# Patient Record
Sex: MALE | Race: WHITE | HISPANIC OR LATINO | URBAN - METROPOLITAN AREA
[De-identification: names, ages, dates, MRNs, and addresses within clinical notes are randomized per-mention and may not be internally consistent; named-entity substitution may affect disease eponyms.]

---

## 2017-05-23 ENCOUNTER — EMERGENCY (EMERGENCY)
Facility: HOSPITAL | Age: 32
LOS: 1 days | Discharge: HOME | End: 2017-05-23
Admitting: EMERGENCY MEDICINE
Payer: MEDICAID

## 2017-05-23 PROCEDURE — 99283 EMERGENCY DEPT VISIT LOW MDM: CPT | Mod: 25

## 2017-05-23 PROCEDURE — 99283 EMERGENCY DEPT VISIT LOW MDM: CPT

## 2017-12-15 ENCOUNTER — EMERGENCY (EMERGENCY)
Facility: HOSPITAL | Age: 32
LOS: 1 days | Discharge: HOME | End: 2017-12-15
Admitting: EMERGENCY MEDICINE
Payer: MEDICAID

## 2017-12-15 DIAGNOSIS — H57.13 OCULAR PAIN, BILATERAL: ICD-10-CM

## 2017-12-15 DIAGNOSIS — Z23 ENCOUNTER FOR IMMUNIZATION: ICD-10-CM

## 2017-12-15 DIAGNOSIS — T15.11XA FOREIGN BODY IN CONJUNCTIVAL SAC, RIGHT EYE, INITIAL ENCOUNTER: ICD-10-CM

## 2017-12-15 DIAGNOSIS — T15.12XA FOREIGN BODY IN CONJUNCTIVAL SAC, LEFT EYE, INITIAL ENCOUNTER: ICD-10-CM

## 2017-12-15 PROCEDURE — 99283 EMERGENCY DEPT VISIT LOW MDM: CPT | Mod: 25

## 2017-12-15 PROCEDURE — 65205 REMOVE FOREIGN BODY FROM EYE: CPT | Mod: 50

## 2017-12-15 PROCEDURE — 90471 IMMUNIZATION ADMIN: CPT

## 2017-12-15 PROCEDURE — 65205 REMOVE FOREIGN BODY FROM EYE: CPT | Mod: E1,E3

## 2018-01-02 ENCOUNTER — APPOINTMENT (OUTPATIENT)
Dept: FAMILY MEDICINE | Facility: HOSPITAL | Age: 33
End: 2018-01-02
Payer: MEDICAID

## 2018-01-02 ENCOUNTER — OUTPATIENT (OUTPATIENT)
Dept: OUTPATIENT SERVICES | Facility: HOSPITAL | Age: 33
LOS: 1 days | End: 2018-01-02
Payer: COMMERCIAL

## 2018-01-02 ENCOUNTER — FORM ENCOUNTER (OUTPATIENT)
Age: 33
End: 2018-01-02

## 2018-01-02 VITALS
TEMPERATURE: 97.8 F | WEIGHT: 183 LBS | BODY MASS INDEX: 30.49 KG/M2 | RESPIRATION RATE: 14 BRPM | HEART RATE: 65 BPM | SYSTOLIC BLOOD PRESSURE: 120 MMHG | DIASTOLIC BLOOD PRESSURE: 77 MMHG | HEIGHT: 65 IN | OXYGEN SATURATION: 98 %

## 2018-01-02 DIAGNOSIS — B20 HUMAN IMMUNODEFICIENCY VIRUS [HIV] DISEASE: ICD-10-CM

## 2018-01-02 DIAGNOSIS — Z86.39 PERSONAL HISTORY OF OTHER ENDOCRINE, NUTRITIONAL AND METABOLIC DISEASE: ICD-10-CM

## 2018-01-02 DIAGNOSIS — Z78.9 OTHER SPECIFIED HEALTH STATUS: ICD-10-CM

## 2018-01-02 DIAGNOSIS — R10.11 RIGHT UPPER QUADRANT PAIN: ICD-10-CM

## 2018-01-02 DIAGNOSIS — Z00.00 ENCOUNTER FOR GENERAL ADULT MEDICAL EXAMINATION WITHOUT ABNORMAL FINDINGS: ICD-10-CM

## 2018-01-02 DIAGNOSIS — R07.81 PLEURODYNIA: ICD-10-CM

## 2018-01-03 ENCOUNTER — FORM ENCOUNTER (OUTPATIENT)
Age: 33
End: 2018-01-03

## 2018-01-03 DIAGNOSIS — R10.11 RIGHT UPPER QUADRANT PAIN: ICD-10-CM

## 2018-01-03 DIAGNOSIS — R07.81 PLEURODYNIA: ICD-10-CM

## 2018-01-03 PROCEDURE — 71045 X-RAY EXAM CHEST 1 VIEW: CPT | Mod: 26,76

## 2018-01-04 PROCEDURE — 80053 COMPREHEN METABOLIC PANEL: CPT

## 2018-01-04 PROCEDURE — 76700 US EXAM ABDOM COMPLETE: CPT

## 2018-01-04 PROCEDURE — 71045 X-RAY EXAM CHEST 1 VIEW: CPT

## 2018-01-04 PROCEDURE — G0463: CPT

## 2018-01-04 PROCEDURE — 36415 COLL VENOUS BLD VENIPUNCTURE: CPT

## 2018-01-04 PROCEDURE — 76700 US EXAM ABDOM COMPLETE: CPT | Mod: 26

## 2018-01-04 PROCEDURE — 85025 COMPLETE CBC W/AUTO DIFF WBC: CPT

## 2018-01-17 ENCOUNTER — APPOINTMENT (OUTPATIENT)
Dept: FAMILY MEDICINE | Facility: HOSPITAL | Age: 33
End: 2018-01-17

## 2018-01-17 ENCOUNTER — OUTPATIENT (OUTPATIENT)
Dept: OUTPATIENT SERVICES | Facility: HOSPITAL | Age: 33
LOS: 1 days | End: 2018-01-17
Payer: COMMERCIAL

## 2018-01-17 VITALS
SYSTOLIC BLOOD PRESSURE: 118 MMHG | TEMPERATURE: 98.2 F | RESPIRATION RATE: 16 BRPM | DIASTOLIC BLOOD PRESSURE: 71 MMHG | WEIGHT: 183 LBS | OXYGEN SATURATION: 100 % | HEART RATE: 58 BPM | BODY MASS INDEX: 30.45 KG/M2

## 2018-01-17 DIAGNOSIS — Z00.00 ENCOUNTER FOR GENERAL ADULT MEDICAL EXAMINATION WITHOUT ABNORMAL FINDINGS: ICD-10-CM

## 2018-01-17 PROCEDURE — G0463: CPT

## 2018-01-19 DIAGNOSIS — R10.11 RIGHT UPPER QUADRANT PAIN: ICD-10-CM

## 2020-05-22 ENCOUNTER — EMERGENCY (EMERGENCY)
Facility: HOSPITAL | Age: 35
LOS: 1 days | Discharge: HOME | End: 2020-05-22
Attending: INTERNAL MEDICINE | Admitting: INTERNAL MEDICINE
Payer: COMMERCIAL

## 2020-05-22 VITALS
HEART RATE: 73 BPM | SYSTOLIC BLOOD PRESSURE: 132 MMHG | OXYGEN SATURATION: 97 % | RESPIRATION RATE: 18 BRPM | TEMPERATURE: 98 F | DIASTOLIC BLOOD PRESSURE: 85 MMHG | HEIGHT: 61 IN

## 2020-05-22 DIAGNOSIS — H57.12 OCULAR PAIN, LEFT EYE: ICD-10-CM

## 2020-05-22 PROCEDURE — 99283 EMERGENCY DEPT VISIT LOW MDM: CPT

## 2020-05-22 RX ORDER — POLYMYXIN B SULF/TRIMETHOPRIM 10000-1/ML
1 DROPS OPHTHALMIC (EYE)
Qty: 1 | Refills: 0
Start: 2020-05-22 | End: 2020-05-28

## 2020-05-22 RX ORDER — IBUPROFEN 200 MG
600 TABLET ORAL ONCE
Refills: 0 | Status: COMPLETED | OUTPATIENT
Start: 2020-05-22 | End: 2020-05-22

## 2020-05-22 RX ORDER — POLYMYXIN B SULF/TRIMETHOPRIM 10000-1/ML
2 DROPS OPHTHALMIC (EYE) ONCE
Refills: 0 | Status: COMPLETED | OUTPATIENT
Start: 2020-05-22 | End: 2020-05-22

## 2020-05-22 RX ADMIN — Medication 2 DROP(S): at 14:20

## 2020-05-22 RX ADMIN — Medication 600 MILLIGRAM(S): at 14:20

## 2020-05-22 NOTE — ED PROVIDER NOTE - PATIENT PORTAL LINK FT
You can access the FollowMyHealth Patient Portal offered by [facility] by registering at the following website: [email or web address]. By joining [employer]’s FollowMyHealth portal, you will also be able to view your health information using other applications (apps) compatible with our system.

## 2020-05-22 NOTE — ED PROVIDER NOTE - NSFOLLOWUPINSTRUCTIONS_ED_ALL_ED_FT
Follow up with our Optho clinic at [phone] or our private optho group [phone] within 1-2 days.    Polytrim eye drops- place 1 drop in the right eye every 4 hours.   Cold compresses to eye for pain.  DO NOT RUB THE EYE!  Take Motrin 400mg every 6hrs with food as needed for pain. Worsening, continued or ANY new concerning symptoms return to the emergency department.    Abrasión corneal  Corneal Abrasion     Cher abrasión corneal es un rasguño o lesión en la cubierta transparente en la parte delantera del tonya (córnea). La córnea es cher especie de cúpula transparente que protege el tonya y ayuda a fijar la vista. La córnea está formada por muchas capas. La capa más superficial es cher genaro capa de células llamada epitelio corneal. La córnea es luz de los tejidos más sensibles del cuerpo. Cher abrasión corneal puede resultar muy dolorosa.  Si no se trata, puede infectarse y producir cher úlcera. [place] puede dejar cicatrices. Las cicatrices en la córnea pueden afectar la vista. A veces, las abrasiones pueden volver a aparecer en la misma yoanna, incluso después de que la lesión original haya cicatrizado (síndrome erosivo recurrente).  ¿Cuáles son las causas?  Esta afección puede ser causada por lo siguiente:  Un golpe o pinchazo en el tonya.Cher sustancia arenosa o irritante (cuerpo extraño) en el tonya.Frotarse el tonya en exceso.Ojos muy secos.Ciertas infecciones oculares.Lentes de contacto que no encajan sheryl o que se usan beverly períodos prolongados. También puede lastimarse la córnea cuando se pone o se arnol los lentes de contacto.Cirugía ocular.A veces, la causa es desconocida.  ¿Cuáles son los signos o los síntomas?  Los síntomas de esta afección incluyen lo siguiente:  Dolor en el tonya. El dolor puede empeorar cuando abre o mueve los ojos.Sensación de que tiene algo metido en el tonya.Dificultad para mantener los ojos abiertos o imposibilidad de mantenerlos abiertos.Lagrimeo y enrojecimiento.Sensibilidad a la rubén.Visión borrosa.Dolor de vini.¿Cómo se diagnostica?  Esta afección se puede diagnosticar en función de lo siguiente:  Dora antecedentes médicos.Dora síntomas.Un examen ocular. Puede consultar a un especialista en afecciones y enfermedades oculares (oftalmólogo). Antes del examen ocular, es posible que le coloquen gotas anestésicas dentro del tonya. También es posible que le coloquen cher sustancia de contraste con un gotero o con cher pequeña celestina de papel. Con la sustancia de contraste, al oftalmólogo le resulta más fácil lion la abrasión cuando le examina el tonya con cher rubén. El oftalmólogo puede examinarle el tonya a través de un oftalmoscopio (lámpara de hendidura).¿Cómo se trata esta afección?  El tratamiento puede variar según la causa de la afección, y puede incluir lo siguiente:  Lavado del tonya.Eliminación de todo cuerpo extraño.Gotas o pomadas con antibiótico para tratar cher infección.Gotas o pomadas con corticoesteroides para tratar el enrojecimiento, la irritación o la inflamación.Analgésicos.Un parche ocular para mantener el tonya cerrado.Siga estas indicaciones en hopper casa:  Medicamentos     Use gotas o pomadas oftálmicas según las indicaciones del médico.Si le recetaron gotas o pomada con antibiótico, úselas según las indicaciones del médico. No deje de usar el antibiótico aunque comience a sentirse mejor.[place] los medicamentos de venta yolie y los recetados solamente ree se lo haya indicado el médico.No conduzca ni use maquinaria pesada mientras mahad analgésicos recetados.Instrucciones generales     Si tiene un parche ocular, úselo según las indicaciones del médico.  No conduzca ni use maquinaria mientras usa el parche ocular. En estas condiciones no puede juzgar correctamente las distancias.Siga las indicaciones del médico acerca de cuándo quitarse el parche.Pregúntele al médico si puede usar un paño frío y húmedo (compresa) en el tonya para aliviar el dolor.No se toque ni se frote el tonya. No se lave el tonya.No use lentes de contacto hasta que el médico lo autorice.Evite la rubén sharon y la fatiga ocular.Concurra a todas las visitas de control ree se lo haya indicado el médico. [place] es importante para prevenir infecciones y evitar la pérdida de visión.Comuníquese con un médico si:  Continúa con dolor en el tonya y otros síntomas beverly más de 2 días.Tiene síntomas nuevos, ree enrojecimiento, lagrimeo o secreción.Tiene cher secreción que le aaron los ojos pegados a la mañana.El parche ocular se afloja al punto que puede parpadear.Los síntomas vuelven a aparecer después de que la abrasión original haya cicatrizado.Solicite ayuda de inmediato si:  Tiene dolor intenso en el tonya que no mejora con medicamentos.Tiene pérdida de visión.Resumen  Cher abrasión corneal es un rasguño en la cubierta transparente en la parte delantera del tonya (córnea).La abrasión corneal puede causar dolor en el tonya, enrojecimiento, lagrimeo o visión borrosa.Por lo general, esta afección se trata con medicamentos para prevenir infecciones y evitar que queden cicatrices anormales. También es posible que deba usar un parche ocular para cubrirse el tonya.Informe al médico si dora síntomas continúan beverly más de 2 días.Esta información no tiene ree fin reemplazar el consejo del médico. Asegúrese de hacerle al médico cualquier pregunta que tenga.

## 2020-05-22 NOTE — ED ADULT NURSE NOTE - CHIEF COMPLAINT QUOTE
Pt c/o left eye pain since yesterday, possible foreign body, vision test 20/20 right/bilateral eye, left eye 20/30

## 2020-05-22 NOTE — ED PROVIDER NOTE - OBJECTIVE STATEMENT
35 y/o M no pmh pw left eye pain and tearing since yesterday while moving bins at home and "dust flying int he air". Denies visual changes, drainage, fever, chills, headache. Does not wear contact lenses. 33 y/o M no pmh pw left eye pain and tearing since yesterday while moving bins at home and "dust flying in the air". Denies visual changes, drainage, fever, chills, headache. Does not wear contact lenses.

## 2020-05-22 NOTE — ED ADULT TRIAGE NOTE - CHIEF COMPLAINT QUOTE
Pt c/o left eye pain since yesterday, possible foreign body Pt c/o left eye pain since yesterday, possible foreign body, vision test 20/20 right/bilateral eye, left eye 20/30

## 2020-05-22 NOTE — ED PROVIDER NOTE - CLINICAL SUMMARY MEDICAL DECISION MAKING FREE TEXT BOX
33 y/o M no pmh pw left eye pain and tearing since yesterday while moving bins at home and "dust flying int he air". Denies visual changes, drainage, fever, chills, headache. Does not wear contact lenses. Upon exam, patient pain alleviated with Tetracaine. Fluorescein stain revealed uptake and abrasion at 12 O'clock. Will treat with Motrin and Polytrim and have patient follow up with optho 35 y/o M no pmh pw left eye pain and tearing since yesterday while moving bins at home and "dust flying in the air". Denies visual changes, drainage, fever, chills, headache. Does not wear contact lenses. Upon exam, patient pain alleviated with Tetracaine. Fluorescein stain revealed uptake and abrasion at 12 O'clock. Will treat with Motrin and Polytrim and have patient follow up with optho

## 2020-05-22 NOTE — ED ADULT NURSE NOTE - OBJECTIVE STATEMENT
34 yr old male c/o left eye pain since yesterday, possible foreign body, vision test 20/20 right/bilateral eye, left eye 20/30

## 2020-05-22 NOTE — ED PROVIDER NOTE - ATTENDING CONTRIBUTION TO CARE
33 y/o M no pmh pw left eye pain and tearing since yesterday while moving bins at home and "dust flying int he air". Denies visual changes, drainage, fever, chills, headache. Does not wear contact lenses. Upon exam, patient pain alleviated with Tetracaine. Fluorescein stain revealed uptake and abrasion at 12 O'clock. Will treat with Motrin and Polytrim and have patient follow up with optho  Dr. Castanon:  I have reviewed and discussed with the PA/ resident the case specifics, including the history, physical assessment, evaluation, conclusion, laboratory results, and medical plan. I agree with the contents, and conclusions. I have personally examined, and interviewed the patient. 35 y/o M no pmh pw left eye pain and tearing since yesterday while moving bins at home and "dust flying in the air". Denies visual changes, drainage, fever, chills, headache. Does not wear contact lenses. Upon exam, patient pain alleviated with Tetracaine. Fluorescein stain revealed uptake and abrasion at 12 O'clock. Will treat with Motrin and Polytrim and have patient follow up with optho  Dr. Castanon:  I have reviewed and discussed with the PA/ resident the case specifics, including the history, physical assessment, evaluation, conclusion, laboratory results, and medical plan. I agree with the contents, and conclusions. I have personally examined, and interviewed the patient.

## 2020-05-22 NOTE — ED PROVIDER NOTE - CHPI ED SYMPTOMS NEG
no blurred vision/no discharge/no double vision/no drainage/no eye lid swelling/no itching/no photophobia/no purulent drainage

## 2020-08-31 NOTE — ED ADULT NURSE NOTE - FINAL NURSING ELECTRONIC SIGNATURE
Called and advised pt is here and we need orders  
Notified md of bp 99/60 and advised pt states she is normally low  
22-May-2020 14:36

## 2025-02-10 NOTE — ED ADULT NURSE NOTE - CAS EDP DISCH TYPE
Medication: Levothyroxine Sodium 112 MCG Oral Tablet  passed protocol.   Last office visit date: 12/17/2024  Next appointment scheduled?: Yes   Number of refills given: disp 90 refills 3    
Home

## 2025-05-29 NOTE — ED ADULT TRIAGE NOTE - MEANS OF ARRIVAL
2025        John Hawkins   [address] [place] [id number]      John Hawkins,  :  1986    Abdominal ultrasound is normal without any concerning findings.      Thank you.    Electronically signed by:  Danielle WILLIS 2025 03:39 PM  Document generated by:  Danielle WILLIS  2025  If your provider ordered multiple tests; the results may not become available at the same time.  If multiple test results are received within 14 days of one another, you may receive a duplicate.  cc:  Patricia Flores MD    
ambulatory